# Patient Record
Sex: FEMALE | Race: BLACK OR AFRICAN AMERICAN
[De-identification: names, ages, dates, MRNs, and addresses within clinical notes are randomized per-mention and may not be internally consistent; named-entity substitution may affect disease eponyms.]

---
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
## 2021-04-15 DIAGNOSIS — J35.2 HYPERTROPHY OF ADENOIDS: ICD-10-CM

## 2021-04-15 DIAGNOSIS — J30.2 OTHER SEASONAL ALLERGIC RHINITIS: ICD-10-CM

## 2021-04-15 DIAGNOSIS — Z87.09 PERSONAL HISTORY OF OTHER DISEASES OF THE RESPIRATORY SYSTEM: ICD-10-CM

## 2021-04-15 DIAGNOSIS — Z86.69 PERSONAL HISTORY OF OTHER DISEASES OF THE NERVOUS SYSTEM AND SENSE ORGANS: ICD-10-CM

## 2021-04-15 DIAGNOSIS — D22.9 MELANOCYTIC NEVI, UNSPECIFIED: ICD-10-CM

## 2021-04-15 DIAGNOSIS — E55.9 VITAMIN D DEFICIENCY, UNSPECIFIED: ICD-10-CM

## 2021-04-15 DIAGNOSIS — M41.125 ADOLESCENT IDIOPATHIC SCOLIOSIS, THORACOLUMBAR REGION: ICD-10-CM

## 2021-04-19 ENCOUNTER — LABORATORY RESULT (OUTPATIENT)
Age: 21
End: 2021-04-19

## 2021-04-19 ENCOUNTER — APPOINTMENT (OUTPATIENT)
Dept: PEDIATRICS | Facility: CLINIC | Age: 21
End: 2021-04-19
Payer: COMMERCIAL

## 2021-04-19 VITALS
HEART RATE: 98 BPM | OXYGEN SATURATION: 98 % | BODY MASS INDEX: 45.99 KG/M2 | SYSTOLIC BLOOD PRESSURE: 130 MMHG | TEMPERATURE: 97 F | DIASTOLIC BLOOD PRESSURE: 88 MMHG | HEIGHT: 66.85 IN | WEIGHT: 293 LBS

## 2021-04-19 DIAGNOSIS — R03.0 ELEVATED BLOOD-PRESSURE READING, W/OUT DIAGNOSIS OF HYPERTENSION: ICD-10-CM

## 2021-04-19 DIAGNOSIS — M25.511 PAIN IN RIGHT SHOULDER: ICD-10-CM

## 2021-04-19 DIAGNOSIS — Z82.49 FAMILY HISTORY OF ISCHEMIC HEART DISEASE AND OTHER DISEASES OF THE CIRCULATORY SYSTEM: ICD-10-CM

## 2021-04-19 DIAGNOSIS — F41.9 ANXIETY DISORDER, UNSPECIFIED: ICD-10-CM

## 2021-04-19 DIAGNOSIS — Z80.1 FAMILY HISTORY OF MALIGNANT NEOPLASM OF TRACHEA, BRONCHUS AND LUNG: ICD-10-CM

## 2021-04-19 DIAGNOSIS — Z91.018 ALLERGY TO OTHER FOODS: ICD-10-CM

## 2021-04-19 DIAGNOSIS — Z00.00 ENCOUNTER FOR GENERAL ADULT MEDICAL EXAMINATION W/OUT ABNORMAL FINDINGS: ICD-10-CM

## 2021-04-19 DIAGNOSIS — Z82.5 FAMILY HISTORY OF ASTHMA AND OTHER CHRONIC LOWER RESPIRATORY DISEASES: ICD-10-CM

## 2021-04-19 DIAGNOSIS — Z83.518 FAMILY HISTORY OF OTHER SPECIFIED EYE DISORDER: ICD-10-CM

## 2021-04-19 DIAGNOSIS — Z78.9 OTHER SPECIFIED HEALTH STATUS: ICD-10-CM

## 2021-04-19 DIAGNOSIS — Z83.3 FAMILY HISTORY OF DIABETES MELLITUS: ICD-10-CM

## 2021-04-19 DIAGNOSIS — G89.29 PAIN IN RIGHT SHOULDER: ICD-10-CM

## 2021-04-19 DIAGNOSIS — R73.09 OTHER ABNORMAL GLUCOSE: ICD-10-CM

## 2021-04-19 DIAGNOSIS — L50.9 URTICARIA, UNSPECIFIED: ICD-10-CM

## 2021-04-19 PROCEDURE — 99395 PREV VISIT EST AGE 18-39: CPT | Mod: 25

## 2021-04-19 PROCEDURE — 99072 ADDL SUPL MATRL&STAF TM PHE: CPT

## 2021-04-19 PROCEDURE — 36415 COLL VENOUS BLD VENIPUNCTURE: CPT

## 2021-04-19 PROCEDURE — 96160 PT-FOCUSED HLTH RISK ASSMT: CPT | Mod: 59

## 2021-04-19 PROCEDURE — 92551 PURE TONE HEARING TEST AIR: CPT

## 2021-04-19 PROCEDURE — 90620 MENB-4C VACCINE IM: CPT

## 2021-04-19 PROCEDURE — 90471 IMMUNIZATION ADMIN: CPT

## 2021-04-19 PROCEDURE — 99173 VISUAL ACUITY SCREEN: CPT | Mod: 59

## 2021-04-19 PROCEDURE — 96127 BRIEF EMOTIONAL/BEHAV ASSMT: CPT

## 2021-04-19 RX ORDER — ALBUTEROL SULFATE 90 UG/1
108 (90 BASE) INHALANT RESPIRATORY (INHALATION)
Qty: 18 | Refills: 0 | Status: ACTIVE | COMMUNITY
Start: 2020-10-18

## 2021-04-19 RX ORDER — CETIRIZINE HCL 10 MG
10 TABLET ORAL
Refills: 0 | Status: ACTIVE | COMMUNITY

## 2021-04-19 RX ORDER — MULTIVIT-MIN/IRON/FOLIC ACID/K 18-600-40
CAPSULE ORAL
Refills: 0 | Status: ACTIVE | COMMUNITY

## 2021-04-19 NOTE — HISTORY OF PRESENT ILLNESS
[Yes] : Patient goes to dentist yearly [LMP: _____] : LMP: [unfilled] [Cycle Length: _____ days] : Cycle Length: [unfilled] days [Days of Bleeding: _____] : Days of bleeding: [unfilled] [Menstrual products used per day: _____] : Menstrual products used per day: [unfilled] [Age of Menarche: ____] : Age of Menarche: [unfilled] [Mother's age at onset of menses: ____] : Mother's age at onset of menses: [unfilled] [Acne] : acne [Eats meals with family] : eats meals with family [Has family members/adults to turn to for help] : has family members/adults to turn to for help [Is permitted and is able to make independent decisions] : Is permitted and is able to make independent decisions [Grade: ____] : Grade: [unfilled] [Normal Performance] : normal performance [Normal Behavior/Attention] : normal behavior/attention [Normal Homework] : normal homework [Eats regular meals including adequate fruits and vegetables] : eats regular meals including adequate fruits and vegetables [Drinks non-sweetened liquids] : drinks non-sweetened liquids  [Calcium source] : calcium source [Has friends] : has friends [Has interests/participates in community activities/volunteers] : has interests/participates in community activities/volunteers. [Exposure to drugs] : exposure to drugs [Exposure to alcohol] : exposure to alcohol [Uses safety belts/safety equipment] : uses safety belts/safety equipment  [Has peer relationships free of violence] : has peer relationships free of violence [No] : Patient has not had sexual intercourse. [HIV Screening Declined] : HIV Screening Declined [Has ways to cope with stress] : has ways to cope with stress [Displays self-confidence] : displays self-confidence [Gets depressed, anxious, or irritable/has mood swings] : gets depressed, anxious, or irritable/has mood swings [With Teen] : teen [Irregular menses] : no irregular menses [Heavy Bleeding] : no heavy bleeding [Painful Cramps] : no painful cramps [Hirsutism] : no hirsutism [Tampon Use] : no tampon use [Sleep Concerns] : no sleep concerns [At least 1 hour of physical activity a day] : does not do at least 1 hour of physical activity a day [Screen time (except homework) less than 2 hours a day] : no screen time (except homework) less than 2 hours a day [Uses electronic nicotine delivery system] : does not use electronic nicotine delivery system [Exposure to electronic nicotine delivery system] : no exposure to electronic nicotine delivery system [Uses tobacco] : does not use tobacco [Exposure to tobacco] : no exposure to tobacco [Uses drugs] : does not use drugs  [Drinks alcohol] : does not drink alcohol [Impaired/distracted driving] : no impaired/distracted driving [Has problems with sleep] : does not have problems with sleep [de-identified] : SELF. [FreeTextEntry7] : ALLERGIES.  [de-identified] : INTERMITTENT SHOULDER PAIN FOR 2 WEEKS. [de-identified] : DENTIST DELAYED DUE TO COVID.  [de-identified] : POSSIBLY LACTOSE INTOLERANT.  [de-identified] : 2.96 GPA.  [de-identified] : ANXIETY, WOULD LIKE A REFERRAL FOR THERAPIST.  [FreeTextEntry1] : 20 YEAR OLD FEMALE IS HERE FOR A WELL VISIT. PATIENT REPORTS INTERMITTENT RIGHT SHOULDER PAIN FOR 2 WEEKS. SHE HAS NOTICED THIS PAIN IS MAINLY PRESENT AFTER WAKING UP IN THE MORNING, AND SHE STATES SHE IS ABLE TO POP HER RIGHT SHOULDER OUT OF THE SOCKET. PATIENT REPORTS INTERMITTENT HIVES, AND SHE IS UNSURE OF THE CAUSE. PATIENT ALSO STATES SHE IS FREQUENTLY ANXIOUS.

## 2021-04-19 NOTE — DISCUSSION/SUMMARY
[] : The components of the vaccine(s) to be administered today are listed in the plan of care. The disease(s) for which the vaccine(s) are intended to prevent and the risks have been discussed with the caretaker.  The risks are also included in the appropriate vaccination information statements which have been provided to the patient's caregiver.  The caregiver has given consent to vaccinate. [Met privately with the adolescent for part of the office visit?] : Met privately with the adolescent for part of the office visit? Yes [Adolescent demonstrates understanding of his/her conditions and how to take prescribed medications?] : Adolescent demonstrates understanding of his/her conditions and how to take prescribed medications? Yes [FreeTextEntry1] : 20 YEAR OLD FEMALE IS HERE FOR A WELL VISIT. PATIENT REPORTS INTERMITTENT RIGHT SHOULDER PAIN FOR 2 WEEKS. SHE HAS NOTICED THIS PAIN IS MAINLY PRESENT AFTER WAKING UP IN THE MORNING, AND SHE STATES SHE IS ABLE TO POP HER RIGHT SHOULDER OUT OF THE SOCKET. PATIENT REPORTS INTERMITTENT HIVES, AND SHE IS UNSURE OF THE CAUSE. PATIENT ALSO STATES SHE IS FREQUENTLY ANXIOUS. \par \par -PATIENT CAN ACTIVELY DISLOCATE HER RIGHT SHOULDER; REFERRED TO ORTHOPEDICS. \par -PATIENT HAS A HISTORY OF AN ALLERGY TO PORK AND HAS REPORTED INTERMITTENT EPISODES OF HIVES. REFERRED TO ALLERGIST FOR UPDATED TESTING AND POSSIBLE EPI-PEN. PORK IGE AND FOOD ALLERGY PANEL LABS ORDERED TODAY. \par -REFERRED TO PSYCHOLOGY FOR ANXIETY, AS PER PATIENT'S REQUEST. \par -PATIENT IS MORBIDLY OBESE W/ ELEVATED BLOOD PRESSURE, DISCUSSED WEIGHT LOSS. REFERRED TO ENDOCRINOLOGY AND WEIGHT MANAGEMENT. TSH LAB ORDERED. \par -DISCUSSED TRANSITION OF CARE W/ PATIENT. \par -BEXSERO VACCINE ADMINISTERED TODAY.

## 2021-04-19 NOTE — PHYSICAL EXAM
[Alert] : alert [No Acute Distress] : no acute distress [Normocephalic] : normocephalic [Clear tympanic membranes with bony landmarks and light reflex present bilaterally] : clear tympanic membranes with bony landmarks and light reflex present bilaterally  [Nonerythematous Oropharynx] : nonerythematous oropharynx [Supple, full passive range of motion] : supple, full passive range of motion [No Palpable Masses] : no palpable masses [Clear to Auscultation Bilaterally] : clear to auscultation bilaterally [Regular Rate and Rhythm] : regular rate and rhythm [No Murmurs] : no murmurs [+2 Femoral Pulses] : +2 femoral pulses [Soft] : soft [NonTender] : non tender [Non Distended] : non distended [No Hepatomegaly] : no hepatomegaly [No Splenomegaly] : no splenomegaly [No Abnormal Lymph Nodes Palpated] : no abnormal lymph nodes palpated [Normal Muscle Tone] : normal muscle tone [No Gait Asymmetry] : no gait asymmetry [Straight] : straight [FreeTextEntry1] : OBESE [de-identified] : CALCIFICATIONS TO TEETH. CUTTING WISDOM TEETH.  [de-identified] : CAN ACTIVELY DISLOCATE RIGHT SHOULDER.  [de-identified] : POSSIBLE ACANTHOSIS NIGRICANS. ACNE TO BACK. STRIAE TO ABDOMEN.

## 2021-04-20 LAB
ALBUMIN SERPL ELPH-MCNC: 4.8 G/DL
ALP BLD-CCNC: 85 U/L
ALT SERPL-CCNC: 51 U/L
ANION GAP SERPL CALC-SCNC: 11 MMOL/L
APPEARANCE: CLEAR
AST SERPL-CCNC: 23 U/L
BACTERIA: NEGATIVE
BILIRUB SERPL-MCNC: 0.3 MG/DL
BILIRUBIN URINE: NEGATIVE
BLOOD URINE: NEGATIVE
BUN SERPL-MCNC: 9 MG/DL
C TRACH RRNA SPEC QL NAA+PROBE: NOT DETECTED
CALCIUM SERPL-MCNC: 9.7 MG/DL
CHLORIDE SERPL-SCNC: 101 MMOL/L
CHOLEST SERPL-MCNC: 180 MG/DL
CO2 SERPL-SCNC: 25 MMOL/L
COLOR: NORMAL
CREAT SERPL-MCNC: 0.58 MG/DL
GLUCOSE QUALITATIVE U: NEGATIVE
GLUCOSE SERPL-MCNC: 103 MG/DL
HDLC SERPL-MCNC: 46 MG/DL
HYALINE CASTS: 0 /LPF
KETONES URINE: NEGATIVE
LDLC SERPL CALC-MCNC: 116 MG/DL
LEUKOCYTE ESTERASE URINE: NEGATIVE
MICROSCOPIC-UA: NORMAL
N GONORRHOEA RRNA SPEC QL NAA+PROBE: NOT DETECTED
NITRITE URINE: NEGATIVE
NONHDLC SERPL-MCNC: 134 MG/DL
PH URINE: 6
POTASSIUM SERPL-SCNC: 4.3 MMOL/L
PROT SERPL-MCNC: 7.9 G/DL
PROTEIN URINE: NORMAL
RED BLOOD CELLS URINE: 1 /HPF
SODIUM SERPL-SCNC: 137 MMOL/L
SOURCE AMPLIFICATION: NORMAL
SPECIFIC GRAVITY URINE: 1.02
SQUAMOUS EPITHELIAL CELLS: 2 /HPF
TRIGL SERPL-MCNC: 93 MG/DL
TSH SERPL-ACNC: 1.87 UIU/ML
UROBILINOGEN URINE: NORMAL
WHITE BLOOD CELLS URINE: 1 /HPF

## 2021-04-26 LAB
DEPRECATED PORK IGE RAST QL: 0
PORK IGE QN: <0.1 KUA/L
T PALLIDUM AB SER QL IA: NEGATIVE

## 2021-05-24 ENCOUNTER — APPOINTMENT (OUTPATIENT)
Dept: PEDIATRICS | Facility: CLINIC | Age: 21
End: 2021-05-24
Payer: COMMERCIAL

## 2021-05-24 VITALS
WEIGHT: 290.5 LBS | OXYGEN SATURATION: 98 % | BODY MASS INDEX: 45.06 KG/M2 | HEIGHT: 67.2 IN | DIASTOLIC BLOOD PRESSURE: 74 MMHG | SYSTOLIC BLOOD PRESSURE: 118 MMHG | TEMPERATURE: 97.3 F | HEART RATE: 100 BPM

## 2021-05-24 DIAGNOSIS — D47.3 ESSENTIAL (HEMORRHAGIC) THROMBOCYTHEMIA: ICD-10-CM

## 2021-05-24 DIAGNOSIS — E66.01 MORBID (SEVERE) OBESITY DUE TO EXCESS CALORIES: ICD-10-CM

## 2021-05-24 DIAGNOSIS — Z23 ENCOUNTER FOR IMMUNIZATION: ICD-10-CM

## 2021-05-24 DIAGNOSIS — R79.89 OTHER SPECIFIED ABNORMAL FINDINGS OF BLOOD CHEMISTRY: ICD-10-CM

## 2021-05-24 LAB
BARLEY IGE QN: 0.37 KUA/L
BASOPHILS # BLD AUTO: 0.03 K/UL
BASOPHILS NFR BLD AUTO: 0.4 %
CHERRY IGE QN: 0.14 KUA/L
COW MILK IGE QN: 0.15 KUA/L
CRAB IGE QN: <0.1 KUA/L
DEPRECATED BARLEY IGE RAST QL: 1
DEPRECATED CHERRY IGE RAST QL: NORMAL
DEPRECATED COW MILK IGE RAST QL: NORMAL
DEPRECATED CRAB IGE RAST QL: 0
DEPRECATED EGG WHITE IGE RAST QL: 1
DEPRECATED OAT IGE RAST QL: 1
DEPRECATED PEANUT IGE RAST QL: 0
DEPRECATED RYE IGE RAST QL: NORMAL
DEPRECATED SOYBEAN IGE RAST QL: NORMAL
DEPRECATED WHEAT IGE RAST QL: 1
EGG WHITE IGE QN: 0.58 KUA/L
EOSINOPHIL # BLD AUTO: 0.16 K/UL
EOSINOPHIL NFR BLD AUTO: 1.9 %
HCT VFR BLD CALC: 41.7 %
HGB BLD-MCNC: 12.9 G/DL
IMM GRANULOCYTES NFR BLD AUTO: 0.2 %
LYMPHOCYTES # BLD AUTO: 2.83 K/UL
LYMPHOCYTES NFR BLD AUTO: 33.2 %
MAN DIFF?: NORMAL
MCHC RBC-ENTMCNC: 27 PG
MCHC RBC-ENTMCNC: 30.9 GM/DL
MCV RBC AUTO: 87.4 FL
MONOCYTES # BLD AUTO: 0.58 K/UL
MONOCYTES NFR BLD AUTO: 6.8 %
NEUTROPHILS # BLD AUTO: 4.9 K/UL
NEUTROPHILS NFR BLD AUTO: 57.5 %
OAT IGE QN: 0.52 KUA/L
PEANUT IGE QN: <0.1 KUA/L
PLATELET # BLD AUTO: 505 K/UL
RBC # BLD: 4.77 M/UL
RBC # FLD: 13.8 %
RYE IGE QN: 0.28 KUA/L
SOYBEAN IGE QN: 0.13 KUA/L
TOTAL IGE SMQN RAST: 243 KU/L
WBC # FLD AUTO: 8.52 K/UL
WHEAT IGE QN: 0.4 KUA/L

## 2021-05-24 PROCEDURE — 36415 COLL VENOUS BLD VENIPUNCTURE: CPT

## 2021-05-24 PROCEDURE — 99213 OFFICE O/P EST LOW 20 MIN: CPT | Mod: 25

## 2021-05-24 PROCEDURE — 90471 IMMUNIZATION ADMIN: CPT

## 2021-05-24 PROCEDURE — 99072 ADDL SUPL MATRL&STAF TM PHE: CPT

## 2021-05-24 PROCEDURE — 90620 MENB-4C VACCINE IM: CPT

## 2021-05-24 NOTE — DISCUSSION/SUMMARY
[] : The components of the vaccine(s) to be administered today are listed in the plan of care. The disease(s) for which the vaccine(s) are intended to prevent and the risks have been discussed with the caretaker.  The risks are also included in the appropriate vaccination information statements which have been provided to the patient's caregiver.  The caregiver has given consent to vaccinate. [FreeTextEntry1] : 20 YEAR OLD FEMALE IS HERE FOLLOWING UP FOR VACCINATION AND REPEAT BLOOD WORK. PATIENT'S LAST LAB RESULTS DISPLAY ABNORMAL CBC AND LIVER FUNCTION TEST. PATIENT REPORTS SHE HAS BEEN UNABLE TO FIND A THERAPIST SINCE LAST VISIT HERE. \par \par -CBC AND HEPATIC FUNCTION PANEL LABS ORDERED.\par -PSYCHOLOGIST PHONE NUMBER AND WEIGHT MANAGEMENT REFERRAL GIVEN TODAY.\par -BEXSERO VACCINE ADMINISTERED. \par ENCOURAGED PATIENT TO F/U ORTHOPEDICS, PSYCH AND ENDOCRINE

## 2021-05-24 NOTE — HISTORY OF PRESENT ILLNESS
[de-identified] : IMMUNIZATION AND REPEAT BLOOD WORK. [FreeTextEntry6] : 20 YEAR OLD FEMALE IS HERE FOLLOWING UP FOR VACCINATION AND REPEAT BLOOD WORK. PATIENT'S LAST LAB RESULTS DISPLAY ABNORMAL CBC AND LIVER FUNCTION TEST. PATIENT REPORTS SHE HAS BEEN UNABLE TO FIND A THERAPIST SINCE LAST VISIT HERE.

## 2021-05-25 LAB
ALBUMIN SERPL ELPH-MCNC: 4.9 G/DL
ALP BLD-CCNC: 85 U/L
ALT SERPL-CCNC: 43 U/L
AST SERPL-CCNC: 23 U/L
BASOPHILS # BLD AUTO: 0.05 K/UL
BASOPHILS NFR BLD AUTO: 0.6 %
BILIRUB DIRECT SERPL-MCNC: 0.1 MG/DL
BILIRUB INDIRECT SERPL-MCNC: 0.1 MG/DL
BILIRUB SERPL-MCNC: 0.2 MG/DL
EOSINOPHIL # BLD AUTO: 0.38 K/UL
EOSINOPHIL NFR BLD AUTO: 4.6 %
HCT VFR BLD CALC: 40.9 %
HGB BLD-MCNC: 12.9 G/DL
IMM GRANULOCYTES NFR BLD AUTO: 0.2 %
LYMPHOCYTES # BLD AUTO: 3.49 K/UL
LYMPHOCYTES NFR BLD AUTO: 42 %
MAN DIFF?: NORMAL
MCHC RBC-ENTMCNC: 27.3 PG
MCHC RBC-ENTMCNC: 31.5 GM/DL
MCV RBC AUTO: 86.7 FL
MONOCYTES # BLD AUTO: 0.59 K/UL
MONOCYTES NFR BLD AUTO: 7.1 %
NEUTROPHILS # BLD AUTO: 3.78 K/UL
NEUTROPHILS NFR BLD AUTO: 45.5 %
PLATELET # BLD AUTO: 497 K/UL
PROT SERPL-MCNC: 7.8 G/DL
RBC # BLD: 4.72 M/UL
RBC # FLD: 13.5 %
WBC # FLD AUTO: 8.31 K/UL

## 2021-05-27 ENCOUNTER — APPOINTMENT (OUTPATIENT)
Dept: PEDIATRIC ALLERGY IMMUNOLOGY | Facility: CLINIC | Age: 21
End: 2021-05-27